# Patient Record
Sex: MALE | Race: WHITE | NOT HISPANIC OR LATINO | Employment: STUDENT | URBAN - METROPOLITAN AREA
[De-identification: names, ages, dates, MRNs, and addresses within clinical notes are randomized per-mention and may not be internally consistent; named-entity substitution may affect disease eponyms.]

---
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
## 2021-01-05 ENCOUNTER — OFFICE VISIT (OUTPATIENT)
Dept: PEDIATRICS CLINIC | Facility: MEDICAL CENTER | Age: 21
End: 2021-01-05
Payer: COMMERCIAL

## 2021-01-05 VITALS
BODY MASS INDEX: 26.96 KG/M2 | DIASTOLIC BLOOD PRESSURE: 62 MMHG | RESPIRATION RATE: 16 BRPM | HEIGHT: 65 IN | SYSTOLIC BLOOD PRESSURE: 110 MMHG | WEIGHT: 161.8 LBS | HEART RATE: 85 BPM

## 2021-01-05 DIAGNOSIS — Z71.82 EXERCISE COUNSELING: ICD-10-CM

## 2021-01-05 DIAGNOSIS — Z13.31 ENCOUNTER FOR SCREENING FOR DEPRESSION: ICD-10-CM

## 2021-01-05 DIAGNOSIS — Z00.00 WELL ADULT EXAM: Primary | ICD-10-CM

## 2021-01-05 DIAGNOSIS — Z71.3 NUTRITIONAL COUNSELING: ICD-10-CM

## 2021-01-05 PROCEDURE — 96127 BRIEF EMOTIONAL/BEHAV ASSMT: CPT | Performed by: STUDENT IN AN ORGANIZED HEALTH CARE EDUCATION/TRAINING PROGRAM

## 2021-01-05 PROCEDURE — 92551 PURE TONE HEARING TEST AIR: CPT | Performed by: STUDENT IN AN ORGANIZED HEALTH CARE EDUCATION/TRAINING PROGRAM

## 2021-01-05 PROCEDURE — 99395 PREV VISIT EST AGE 18-39: CPT | Performed by: STUDENT IN AN ORGANIZED HEALTH CARE EDUCATION/TRAINING PROGRAM

## 2021-01-05 PROCEDURE — 99173 VISUAL ACUITY SCREEN: CPT | Performed by: STUDENT IN AN ORGANIZED HEALTH CARE EDUCATION/TRAINING PROGRAM

## 2021-01-05 NOTE — PROGRESS NOTES
Assessment:     Well 24year old male adolescent  Normal growth  Negative PHQ, no risk taking behavior  Advised to see dentists for routine care  No concerns today  Advised to continue follow up with an adult provider  1  Well adult exam     2  Exercise counseling     3  Nutritional counseling     4  BMI 26 0-26 9,adult     5  Encounter for screening for depression          Plan:     1  Anticipatory guidance discussed  BMI Counseling: Body mass index is 26 92 kg/m²  The BMI is above normal  Nutrition recommendations include encouraging healthy choices of fruits and vegetables  Exercise recommendations include exercising 3-5 times per week  PHQ-9 Depression Screening    PHQ-9:   Frequency of the following problems over the past two weeks:      Little interest or pleasure in doing things: 0 - not at all  Feeling down, depressed, or hopeless: 0 - not at all  PHQ-2 Score: 0         2  Development: appropriate for age    1  Immunizations today: up to date    4  Follow-up visit in 1 year for next well child visit, or sooner as needed with adult provider      Subjective:     Brina Calderon is a 24 y o  male who is here for this well-child visit  Current Issues: mild intermittent asthma, well controlled with occasional albuterol use a few times a year    Current concerns include none  Well Child Assessment:  Luigi Ramirez lives with his mother, father, grandfather, uncle and grandmother  Interval problems do not include recent illness or recent injury  Nutrition  Types of intake include fruits, meats and vegetables  Dental  The patient does not have a dental home  The patient brushes teeth regularly  Elimination  Elimination problems do not include constipation  Sleep  The patient does not snore  There are no sleep problems  Safety  There is no smoking in the home  Screening  There are no risk factors for vision problems  There are no risk factors related to diet   There are no risk factors for sexually transmitted infections  There are no risk factors related to alcohol  There are no risk factors related to relationships  There are no risk factors related to friends or family  There are no risk factors related to emotions  There are no risk factors related to drugs  There are no risk factors related to tobacco      Works stocking shelves  Wearing mask while outside  The following portions of the patient's history were reviewed and updated as appropriate: allergies, current medications, past family history, past medical history, past social history, past surgical history and problem list           Objective:       Vitals:    01/05/21 1139   BP: 110/62   BP Location: Left arm   Patient Position: Sitting   Cuff Size: Adult   Pulse: 85   Resp: 16   Weight: 73 4 kg (161 lb 12 8 oz)   Height: 5' 5" (1 651 m)     Growth parameters are noted and are appropriate for age  Wt Readings from Last 1 Encounters:   01/05/21 73 4 kg (161 lb 12 8 oz) (59 %, Z= 0 24)*     * Growth percentiles are based on [facility] (Boys, 2-20 Years) data  Ht Readings from Last 1 Encounters:   01/05/21 5' 5" (1 651 m) (5 %, Z= -1 64)*     * Growth percentiles are based on CDC (Boys, 2-20 Years) data  Body mass index is 26 92 kg/m²  Vitals:    01/05/21 1139   BP: 110/62   BP Location: Left arm   Patient Position: Sitting   Cuff Size: Adult   Pulse: 85   Resp: 16   Weight: 73 4 kg (161 lb 12 8 oz)   Height: 5' 5" (1 651 m)        Hearing Screening    125Hz 250Hz 500Hz 1000Hz 2000Hz 3000Hz 4000Hz 6000Hz 8000Hz   Right ear: 25 25 25 25 25 25 25 25 25   Left ear: 25 25 25 25 25 25 25 25 25      Visual Acuity Screening    Right eye Left eye Both eyes   Without correction: 20/20 20/20 20/20   With correction:          Physical Exam  Vitals signs reviewed  Constitutional:       Appearance: Normal appearance  HENT:      Head: Normocephalic  Right Ear: Ear canal normal  There is impacted cerumen        Left Ear: Tympanic membrane and ear canal normal       Nose: Nose normal       Mouth/Throat:      Mouth: Mucous membranes are moist       Pharynx: Oropharynx is clear  Eyes:      Extraocular Movements: Extraocular movements intact  Conjunctiva/sclera: Conjunctivae normal       Pupils: Pupils are equal, round, and reactive to light  Neck:      Musculoskeletal: Normal range of motion and neck supple  Cardiovascular:      Rate and Rhythm: Normal rate and regular rhythm  Pulses: Normal pulses  Heart sounds: Normal heart sounds  Pulmonary:      Effort: Pulmonary effort is normal       Breath sounds: Normal breath sounds  Abdominal:      General: Abdomen is flat  Bowel sounds are normal       Palpations: Abdomen is soft  Musculoskeletal: Normal range of motion  General: No swelling or deformity  Skin:     General: Skin is warm and dry  Capillary Refill: Capillary refill takes less than 2 seconds  Findings: No rash  Neurological:      General: No focal deficit present  Mental Status: He is alert     Psychiatric:         Mood and Affect: Mood normal          Behavior: Behavior normal

## 2022-03-28 ENCOUNTER — TELEPHONE (OUTPATIENT)
Dept: PEDIATRICS CLINIC | Facility: MEDICAL CENTER | Age: 22
End: 2022-03-28

## 2022-04-19 ENCOUNTER — TELEPHONE (OUTPATIENT)
Dept: PEDIATRICS CLINIC | Facility: MEDICAL CENTER | Age: 22
End: 2022-04-19